# Patient Record
Sex: FEMALE | Race: WHITE | Employment: UNEMPLOYED | URBAN - METROPOLITAN AREA
[De-identification: names, ages, dates, MRNs, and addresses within clinical notes are randomized per-mention and may not be internally consistent; named-entity substitution may affect disease eponyms.]

---

## 2022-01-19 ENCOUNTER — TRANSCRIBE ORDER (OUTPATIENT)
Dept: SCHEDULING | Age: 62
End: 2022-01-19

## 2022-01-20 ENCOUNTER — TRANSCRIBE ORDER (OUTPATIENT)
Dept: SCHEDULING | Age: 62
End: 2022-01-20

## 2022-01-20 DIAGNOSIS — M51.36 DEGENERATIVE DISC DISEASE, LUMBAR: Primary | ICD-10-CM

## 2022-02-06 ENCOUNTER — HOSPITAL ENCOUNTER (OUTPATIENT)
Dept: MRI IMAGING | Age: 62
Discharge: HOME | End: 2022-02-06
Attending: NURSE PRACTITIONER
Payer: COMMERCIAL

## 2022-02-06 DIAGNOSIS — M51.36 DEGENERATIVE DISC DISEASE, LUMBAR: ICD-10-CM

## 2022-02-06 PROCEDURE — 72148 MRI LUMBAR SPINE W/O DYE: CPT

## 2022-03-21 ENCOUNTER — APPOINTMENT (OUTPATIENT)
Dept: PHYSICAL THERAPY | Age: 62
End: 2022-03-21

## 2022-03-24 ENCOUNTER — HOSPITAL ENCOUNTER (OUTPATIENT)
Dept: PHYSICAL THERAPY | Age: 62
Discharge: HOME | End: 2022-03-24
Payer: COMMERCIAL

## 2022-03-24 PROCEDURE — 97161 PT EVAL LOW COMPLEX 20 MIN: CPT | Performed by: PHYSICAL THERAPIST

## 2022-03-24 PROCEDURE — 97110 THERAPEUTIC EXERCISES: CPT | Performed by: PHYSICAL THERAPIST

## 2022-03-24 NOTE — PROGRESS NOTES
Physical Therapy at [facility],   a part of [facility]  [address]  [place], [address]  Phone: [phone]  Fax: [phone]    Plan of Care/Statement of Necessity for Physical Therapy Services  2-15    Patient name: Milla Dooley  : 1960  Provider#: [id number]  Referral source: Mnoi Gutierrez MD      Medical/Treatment Diagnosis: Lower back pain [M54.50]     Prior Hospitalization: see medical history     Comorbidities: arthritis, HTN  Prior Level of Function: no regular exercise, no previous LBP  Medications: Verified on Patient Summary List  Start of Care: 3/24/22      Onset Date: 2021   The Plan of Care and following information is based on the information from the initial evaluation. Assessment/ key information: Pt is a pleasant 64year old female with chief complaint of R sided low back pain. She presents with reduced and pain AROM lumbar spine, weakness in bilateral hips and core, and TTP throughout R hip musculature. Pt will benefit from skilled PT services to address physical limitations in order to reduce pain and improve QOL.      Evaluation Complexity History MEDIUM  Complexity : 1-2 comorbidities / personal factors will impact the outcome/ POC ; Examination LOW Complexity : 1-2 Standardized tests and measures addressing body structure, function, activity limitation and / or participation in recreation  ;Presentation LOW Complexity : Stable, uncomplicated  ;Clinical Decision Making MEDIUM Complexity : FOTO score of 26-74  Overall Complexity Rating: LOW     Problem List: pain affecting function, decrease ROM, decrease strength, decrease ADL/ functional abilitiies, decrease activity tolerance and decrease flexibility/ joint mobility   Treatment Plan may include any combination of the following: Therapeutic exercise, Therapeutic activities, Neuromuscular re-education, Physical agent/modality, Gait/balance training, Manual therapy, Patient education, Self Care training, Functional mobility training and Stair training  Patient / Family readiness to learn indicated by: asking questions and interest  Persons(s) to be included in education: patient (P)  Barriers to Learning/Limitations: None  Patient Goal (s): Pritesh Harvey my pain  Patient Self Reported Health Status: good  Rehabilitation Potential: good    Short and Long Term Goals: To be accomplished in 6-8 weeks:  1) Pt will be ind with HEP and progressions. 2) Pt will be able to Stand greater than 60 minutes without increase of pain  3) Pt will be able to Ambulate greater than 1/2 mile without increase of pain  4) Pt will be able to retrieve item form ground without pain       Frequency / Duration: Patient to be seen 2 times per week for 6-8 weeks. Patient/ Caregiver education and instruction: self care, activity modification and exercises    [x]  Plan of care has been reviewed with TAMEKA Rojas, PT 3/24/2022    ________________________________________________________________________    I certify that the above Therapy Services are being furnished while the patient is under my care. I agree with the treatment plan and certify that this therapy is necessary.     [de-identified] Signature:____________________  Date:____________Time: _________         Astrid Weiner MD

## 2022-03-24 NOTE — PROGRESS NOTES
PT INITIAL EVALUATION NOTE - [facility] 2-15    Patient Name: Erich Mckee  Date:3/24/2022  : 1960  [x]  Patient  Verified  Payor: BLUE CROSS / Plan: [facility] PPO / Product Type: PPO /    In time: 242pm Out time: 320pm  Total Treatment Time (min): 38  Total Timed Codes (min): 8  1:1 Treatment Time ( only): 8  Visit #: 1     Treatment Area: Lower back pain [M54.50]    SUBJECTIVE  Pain Level (0-10 scale): 6/10  Any medication changes, allergies to medications, adverse drug reactions, diagnosis change, or new procedure performed?: [] No    [x] Yes (see summary sheet for update)  Subjective:    Pt reports pain beginning approximately 4 months ago with insidious onset. Standing is the worst and walking increase pain. Sitting on couch reclined back helps to relieve some of the pain. Pain located across low back with greatest pain on R side low back into R buttocks. Pt did have a few days with anterior R thigh pain, but this has resolved. Denies N/T. She has tried heat, ice, and ibuprofen, but nothing helps with pain. PLOF: No exercise, no previous LBP  Mechanism of Injury: insidious  Previous Treatment/Compliance: unable to take muscle relaxer   PMHx/Surgical Hx: see medical history  Work Hx: not working. 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 Living Situation: lives with others in multilevel home denying issues with stair climbing  Pt Goals: reduce pain  Barriers: none  Motivation: good  Substance use: none  Cognition: A & O x 4        OBJECTIVE/EXAMINATION    OBJECTIVE    Posture:  R shoulder elevated, mild R rib hump  Other Observations:  --  Gait and Functional Mobility:  No gait deviations noted. Able to transfer sit<>supine without difficulty  Palpation: TTP R QL, glute med, and piriformis        Lumbar AROM:          R  L    Flexion    25%      Extension   50% p! Side Bending   1inch from knee p! R side into R buttock  1 inch from knee p!  On R    Rotation   WNL  WNL        LOWER QUARTER   MUSCLE STRENGTH  KEY       R  L  0 - No Contraction  L1, L2 Psoas  4 p!  5 p!  1 - Trace   L3 Quads  4+  4+  2 - Poor   L4 Tib Ant  5  5  3 - Fair    L5 EHL  5  5  4 - Good   S1 Peroneals  5  5  5 - Normal   S2 Hams  4+  4+    Flexibility: tight hamstrings  Mobility Assessment: hypomobile thoracic and upper lumbar spine with normal mobility lower lumbar spine. Pt reported \"sore\" with all. MMT:               HIP Ext: 4/5              HIP Abd: 4/5  Neurological: Reflexes / Sensations: NT  Special Tests:    Trendelenberg: neg   FABERS: neg   Forward Bend: pos L  Rib hump      H.S. SLR: pos R             8 min Therapeutic Exercise:  [x] See flow sheet :   Rationale: increase ROM and increase strength to improve the patients ability to perform ADLs, standing, and ambulation with reduced pain.            With   [] TE   [] TA   [] Neuro   [] SC   [] other: Patient Education: [x] Review HEP    [] Progressed/Changed HEP based on:   [] positioning   [] body mechanics   [] transfers   [] heat/ice application    [] other:      Other Objective/Functional Measures: FOTO Functional Measure: NT/100                         Pain Level (0-10 scale) post treatment: 7/10    ASSESSMENT/Changes in Function:     [x]  See Plan of Onel Guerra [id number], PT 3/24/2022

## 2022-03-29 ENCOUNTER — HOSPITAL ENCOUNTER (OUTPATIENT)
Dept: PHYSICAL THERAPY | Age: 62
Discharge: HOME | End: 2022-03-29
Payer: COMMERCIAL

## 2022-03-29 PROCEDURE — 97110 THERAPEUTIC EXERCISES: CPT

## 2022-03-29 NOTE — PROGRESS NOTES
PT DAILY TREATMENT NOTE - [facility] 2-15    Patient Name: Tere Tobin  Date:3/29/2022  : 1960  [x]  Patient  Verified  Payor: BLUE CROSS / Plan: Debbie Solares [id number] PPO / Product Type: PPO /    In time:  2:40 P Out time: 3:35 P  Total Treatment Time (min): 55  Total Timed Codes (min): 50  1:1 Treatment Time ( only): 50   Visit #:  2    Treatment Area: Lower back pain [M54.50]    SUBJECTIVE  Pain Level (0-10 scale): 5/10  Any medication changes, allergies to medications, adverse drug reactions, diagnosis change, or new procedure performed?: [x] No    [] Yes (see summary sheet for update)  Subjective functional status/changes:   [] No changes reported  Pt reports 5/10 pain, greater on L side today \"for some reason\", woke with pain in low back, as day went on R side improved and L side worsened      Pt reports that she performed exercises 2 x at home but then experienced increased pain and felt like it may be aggravated so held from doing; unable to identify specific exercise that increased pain     OBJECTIVE    Modality rationale: decrease inflammation and decrease pain to improve the patients ability to ADLs, standing, and ambulation with reduced pain   Min Type Additional Details       [] Estim: []Att   []Unatt    []TENS instruct                  []IFC  []Premod   []NMES                     []Other:  []w/US   []w/ice   []w/heat  Position:  Location:       []  Traction: [] Cervical       []Lumbar                       [] Prone          []Supine                       []Intermittent   []Continuous Lbs:  [] before manual  [] after manual  []w/heat    []  Ultrasound: []Continuous   [] Pulsed                       at: []1MHz   []3MHz Location:  W/cm2:    [] Paraffin         Location:   []w/heat   declined [x]  Ice     []  Heat  []  Ice massage Position:   Location:    []  Laser  []  Other: Position:  Location:      []  Vasopneumatic Device Pressure:       [] lo [] med [] hi   Temperature:      [x] Skin assessment post-treatment:  [x]intact []redness- no adverse reaction    []redness  adverse reaction:     50 min Therapeutic Exercise:  [x] See flow sheet : review HEP, prog ex as per chart   Rationale: increase ROM, increase strength, improve coordination, improve balance and increase proprioception to improve the patients ability to ADLs, standing, and ambulation with reduced pain          With   [x] TE   [] TA   [] Neuro   [] SC   [] other: Patient Education: [x] Review HEP - advised pt to perform ex in pain free ranges, to make note of any exercise that incr pain so can discuss next treatment session   [] Progressed/Changed HEP based on:   [] positioning   [] body mechanics   [] transfers   [x] heat/ice application    [] other:      Other Objective/Functional Measures:      Pain Level (0-10 scale) post treatment: 3/10    ASSESSMENT/Changes in Function:     Review of HEP for any aggravation of sx based on patient report of poor mojgan at home however unable to identify specific exercise - advised to make note at home of any ex that cause pain; challenged w/ performing bridge w/out posterior pelvic tilt     Patient will continue to benefit from skilled PT services to modify and progress therapeutic interventions, address functional mobility deficits, address ROM deficits, address strength deficits, analyze and address soft tissue restrictions, analyze and cue movement patterns, analyze and modify body mechanics/ergonomics and assess and modify postural abnormalities to attain remaining goals. [x]  See Plan of Care  []  See progress note/recertification  []  See Discharge Summary         Progress towards goals / Updated goals:  Short and Long Term Goals: To be accomplished in 6-8 weeks:  1) Pt will be ind with HEP and progressions.    2) Pt will be able to Stand greater than 60 minutes without increase of pain  3) Pt will be able to Ambulate greater than 1/2 mile without increase of pain  4) Pt will be able to retrieve item form ground without pain    PLAN  []  Upgrade activities as tolerated     [x]  Continue plan of care  []  Update interventions per flow sheet       []  Discharge due to:_  []  Other:_      Mari Valdes, PT 3/29/2022

## 2022-03-31 ENCOUNTER — APPOINTMENT (OUTPATIENT)
Dept: PHYSICAL THERAPY | Age: 62
End: 2022-03-31
Payer: COMMERCIAL

## 2022-04-05 ENCOUNTER — HOSPITAL ENCOUNTER (OUTPATIENT)
Dept: PHYSICAL THERAPY | Age: 62
Discharge: HOME | End: 2022-04-05
Payer: COMMERCIAL

## 2022-04-05 PROCEDURE — 97110 THERAPEUTIC EXERCISES: CPT | Performed by: PHYSICAL THERAPIST

## 2022-04-05 NOTE — PROGRESS NOTES
PT DAILY TREATMENT NOTE - [facility] 2-15    Patient Name: Christi Osei  Date:2022  : 1960  [x]  Patient  Verified  Payor: Patricia Gan / Plan: Debbie Solares [id number] PPO / Product Type: PPO /    In time: 1210 P  Out time:105 P   Total Treatment Time (min): 55  Total Timed Codes (min): 55  1:1 Treatment Time ( only): 54  Visit #:  3    Treatment Area: Lower back pain [M54.50]    SUBJECTIVE  Pain Level (0-10 scale): 5/10  Any medication changes, allergies to medications, adverse drug reactions, diagnosis change, or new procedure performed?: [x] No    [] Yes (see summary sheet for update)  Subjective functional status/changes:   [] No changes reported  Pt reports still having pain with standing more than anything. Pain less intense than before. OBJECTIVE      [x] Skin assessment post-treatment:  [x]intact []redness- no adverse reaction    []redness  adverse reaction:     55 min Therapeutic Exercise:  [x] See flow sheet :   Rationale: increase ROM, increase strength, improve coordination, improve balance and increase proprioception to improve the patients ability to ADLs, standing, and ambulation with reduced pain          With   [x] TE   [] TA   [] Neuro   [] SC   [] other: Patient Education: [x] Review HEP  [] Progressed/Changed HEP based on:   [] positioning   [] body mechanics   [] transfers   [x] heat/ice application    [] other:      Other Objective/Functional Measures:      Pain Level (0-10 scale) post treatment: 3/10    ASSESSMENT/Changes in Function:       Patient will continue to benefit from skilled PT services to modify and progress therapeutic interventions, address functional mobility deficits, address ROM deficits, address strength deficits, analyze and address soft tissue restrictions, analyze and cue movement patterns, analyze and modify body mechanics/ergonomics and assess and modify postural abnormalities to attain remaining goals.            Progress towards goals / Updated goals:    Pt requires cues for posterior pelvic tilt during exercises, but otherwise performed well. PLAN  []  Upgrade activities as tolerated     [x]  Continue plan of care  [x]  Update interventions per flow sheet       []  Discharge due to:_  []  Other:_      Harper Osborne, PT 4/5/2022

## 2022-04-12 ENCOUNTER — HOSPITAL ENCOUNTER (OUTPATIENT)
Dept: PHYSICAL THERAPY | Age: 62
Discharge: HOME | End: 2022-04-12
Payer: COMMERCIAL

## 2022-04-12 PROCEDURE — 97110 THERAPEUTIC EXERCISES: CPT | Performed by: PHYSICAL THERAPIST

## 2022-04-12 NOTE — PROGRESS NOTES
PT DAILY TREATMENT NOTE - [facility] 2-15    Patient Name: Kathrine Jiménez  Date:2022  : 1960  [x]  Patient  Verified  Payor: BLUE LORETTA / Plan: Debbie Solares [id number] PPO / Product Type: PPO /    In time: 1210 P  Out time:100 P   Total Treatment Time (min): 50  Total Timed Codes (min):50  1:1 Treatment Time ( only): 40  Visit #:  4    Treatment Area: Lower back pain [M54.50]    SUBJECTIVE  Pain Level (0-10 scale): 5/10  Any medication changes, allergies to medications, adverse drug reactions, diagnosis change, or new procedure performed?: [x] No    [] Yes (see summary sheet for update)  Subjective functional status/changes:   [] No changes reported  No change since onset of therapy. Pt reports MD wanted her to follow up after a month of therapy if no greater than 50% change from therapy. OBJECTIVE  [x] Skin assessment post-treatment:  [x]intact []redness- no adverse reaction    []redness  adverse reaction:     50 min Therapeutic Exercise:  [x] See flow sheet :   Rationale: increase ROM, increase strength, improve coordination, improve balance and increase proprioception to improve the patients ability to ADLs, standing, and ambulation with reduced pain          With   [x] TE   [] TA   [] Neuro   [] SC   [] other: Patient Education: [x] Review HEP  [] Progressed/Changed HEP based on:   [] positioning   [] body mechanics   [] transfers   [x] heat/ice application    [] other:      Other Objective/Functional Measures:      Pain Level (0-10 scale) post treatment: 3/10    ASSESSMENT/Changes in Function:              Progress towards goals / Updated goals:  Pt mojgan addition of ex's as noted on flow without pain. PLAN  []  Upgrade activities as tolerated     [x]  Continue plan of care  [x]  Update interventions per flow sheet       []  Discharge due to:_  []  Other:_      Spenser Osborne, PT 2022

## 2022-04-14 ENCOUNTER — APPOINTMENT (OUTPATIENT)
Dept: PHYSICAL THERAPY | Age: 62
End: 2022-04-14
Payer: COMMERCIAL

## 2022-04-19 ENCOUNTER — APPOINTMENT (OUTPATIENT)
Dept: PHYSICAL THERAPY | Age: 62
End: 2022-04-19
Payer: COMMERCIAL

## 2022-04-21 ENCOUNTER — APPOINTMENT (OUTPATIENT)
Dept: PHYSICAL THERAPY | Age: 62
End: 2022-04-21
Payer: COMMERCIAL